# Patient Record
Sex: MALE | Race: BLACK OR AFRICAN AMERICAN | Employment: OTHER | URBAN - METROPOLITAN AREA
[De-identification: names, ages, dates, MRNs, and addresses within clinical notes are randomized per-mention and may not be internally consistent; named-entity substitution may affect disease eponyms.]

---

## 2017-06-19 ENCOUNTER — HOSPITAL ENCOUNTER (OUTPATIENT)
Dept: GENERAL RADIOLOGY | Age: 77
Discharge: HOME | End: 2017-06-19
Payer: MEDICARE

## 2017-06-19 DIAGNOSIS — J44.9 CHRONIC OBSTRUCTIVE PULMONARY DISEASE, UNSPECIFIED COPD TYPE (HCC): ICD-10-CM

## 2017-06-19 PROCEDURE — 71020 XR CHEST PA LAT: CPT

## 2017-08-30 ENCOUNTER — APPOINTMENT (RX ONLY)
Dept: URBAN - METROPOLITAN AREA CLINIC 349 | Facility: CLINIC | Age: 77
Setting detail: DERMATOLOGY
End: 2017-08-30

## 2017-08-30 DIAGNOSIS — L93.0 DISCOID LUPUS ERYTHEMATOSUS: ICD-10-CM | Status: INADEQUATELY CONTROLLED

## 2017-08-30 PROCEDURE — ? COUNSELING

## 2017-08-30 PROCEDURE — 99202 OFFICE O/P NEW SF 15 MIN: CPT

## 2017-08-30 PROCEDURE — ? PRESCRIPTION

## 2017-08-30 RX ORDER — FLUTICASONE PROPIONATE 0.05 MG/G
OINTMENT TOPICAL
Qty: 1 | Refills: 1 | Status: ERX | COMMUNITY
Start: 2017-08-30

## 2017-08-30 RX ADMIN — FLUTICASONE PROPIONATE: 0.05 OINTMENT TOPICAL at 00:00

## 2017-08-30 ASSESSMENT — LOCATION DETAILED DESCRIPTION DERM
LOCATION DETAILED: RIGHT INFERIOR CENTRAL MALAR CHEEK
LOCATION DETAILED: LEFT INFERIOR CENTRAL MALAR CHEEK

## 2017-08-30 ASSESSMENT — LOCATION ZONE DERM: LOCATION ZONE: FACE

## 2017-08-30 ASSESSMENT — LOCATION SIMPLE DESCRIPTION DERM
LOCATION SIMPLE: LEFT CHEEK
LOCATION SIMPLE: RIGHT CHEEK

## 2017-10-02 ENCOUNTER — RX ONLY (OUTPATIENT)
Age: 77
Setting detail: RX ONLY
End: 2017-10-02

## 2017-10-02 ENCOUNTER — APPOINTMENT (RX ONLY)
Dept: URBAN - METROPOLITAN AREA CLINIC 349 | Facility: CLINIC | Age: 77
Setting detail: DERMATOLOGY
End: 2017-10-02

## 2017-10-02 DIAGNOSIS — L93.0 DISCOID LUPUS ERYTHEMATOSUS: ICD-10-CM | Status: IMPROVED

## 2017-10-02 PROCEDURE — ? COUNSELING

## 2017-10-02 PROCEDURE — ? PRESCRIPTION

## 2017-10-02 PROCEDURE — 99212 OFFICE O/P EST SF 10 MIN: CPT

## 2017-10-02 PROCEDURE — ? TREATMENT REGIMEN

## 2017-10-02 RX ORDER — FLUTICASONE PROPIONATE 0.05 MG/G
OINTMENT TOPICAL
Qty: 1 | Refills: 1 | Status: ERX

## 2017-10-02 RX ORDER — MUPIROCIN 20 MG/G
OINTMENT TOPICAL
Qty: 1 | Refills: 1 | Status: ERX | COMMUNITY
Start: 2017-10-02

## 2017-10-02 ASSESSMENT — LOCATION DETAILED DESCRIPTION DERM
LOCATION DETAILED: LEFT INFERIOR CENTRAL MALAR CHEEK
LOCATION DETAILED: RIGHT INFERIOR CENTRAL MALAR CHEEK

## 2017-10-02 ASSESSMENT — LOCATION SIMPLE DESCRIPTION DERM
LOCATION SIMPLE: LEFT CHEEK
LOCATION SIMPLE: RIGHT CHEEK

## 2017-10-02 ASSESSMENT — LOCATION ZONE DERM: LOCATION ZONE: FACE

## 2017-11-10 ENCOUNTER — APPOINTMENT (RX ONLY)
Dept: URBAN - METROPOLITAN AREA CLINIC 349 | Facility: CLINIC | Age: 77
Setting detail: DERMATOLOGY
End: 2017-11-10

## 2017-11-10 DIAGNOSIS — L93.0 DISCOID LUPUS ERYTHEMATOSUS: ICD-10-CM

## 2017-11-10 PROCEDURE — 11900 INJECT SKIN LESIONS </W 7: CPT

## 2017-11-10 PROCEDURE — ? OTHER

## 2017-11-10 PROCEDURE — ? INTRALESIONAL KENALOG

## 2017-11-10 PROCEDURE — ? COUNSELING

## 2017-11-10 PROCEDURE — 99212 OFFICE O/P EST SF 10 MIN: CPT | Mod: 25

## 2017-11-10 ASSESSMENT — LOCATION SIMPLE DESCRIPTION DERM
LOCATION SIMPLE: RIGHT CHEEK
LOCATION SIMPLE: LEFT CHEEK
LOCATION SIMPLE: LEFT FOREHEAD
LOCATION SIMPLE: RIGHT FOREHEAD
LOCATION SIMPLE: CHIN

## 2017-11-10 ASSESSMENT — LOCATION DETAILED DESCRIPTION DERM
LOCATION DETAILED: RIGHT INFERIOR CENTRAL MALAR CHEEK
LOCATION DETAILED: LEFT CENTRAL MALAR CHEEK
LOCATION DETAILED: LEFT CHIN
LOCATION DETAILED: RIGHT CHIN
LOCATION DETAILED: LEFT MEDIAL FOREHEAD
LOCATION DETAILED: RIGHT MEDIAL FOREHEAD
LOCATION DETAILED: RIGHT CENTRAL MALAR CHEEK

## 2017-11-10 ASSESSMENT — LOCATION ZONE DERM: LOCATION ZONE: FACE

## 2017-11-10 NOTE — PROCEDURE: OTHER
Other (Free Text): Patient does not need any refills at this time
Note Text (......Xxx Chief Complaint.): This diagnosis correlates with the
Detail Level: Zone

## 2017-11-14 PROBLEM — J44.9 CHRONIC OBSTRUCTIVE PULMONARY DISEASE (HCC): Status: ACTIVE | Noted: 2017-11-14

## 2018-01-16 ENCOUNTER — APPOINTMENT (RX ONLY)
Dept: URBAN - METROPOLITAN AREA CLINIC 349 | Facility: CLINIC | Age: 78
Setting detail: DERMATOLOGY
End: 2018-01-16

## 2018-01-16 DIAGNOSIS — L93.0 DISCOID LUPUS ERYTHEMATOSUS: ICD-10-CM | Status: STABLE

## 2018-01-16 PROBLEM — J44.9 CHRONIC OBSTRUCTIVE PULMONARY DISEASE, UNSPECIFIED: Status: ACTIVE | Noted: 2018-01-16

## 2018-01-16 PROBLEM — I10 ESSENTIAL (PRIMARY) HYPERTENSION: Status: ACTIVE | Noted: 2018-01-16

## 2018-01-16 PROCEDURE — 99213 OFFICE O/P EST LOW 20 MIN: CPT

## 2018-01-16 PROCEDURE — ? COUNSELING

## 2018-01-16 PROCEDURE — ? TREATMENT REGIMEN

## 2018-01-16 ASSESSMENT — LOCATION DETAILED DESCRIPTION DERM
LOCATION DETAILED: LEFT INFERIOR CENTRAL MALAR CHEEK
LOCATION DETAILED: RIGHT MEDIAL FOREHEAD
LOCATION DETAILED: LEFT CENTRAL MALAR CHEEK
LOCATION DETAILED: LEFT CHIN
LOCATION DETAILED: RIGHT INFERIOR CENTRAL MALAR CHEEK
LOCATION DETAILED: RIGHT INFERIOR CENTRAL MALAR CHEEK

## 2018-01-16 ASSESSMENT — LOCATION SIMPLE DESCRIPTION DERM
LOCATION SIMPLE: RIGHT CHEEK
LOCATION SIMPLE: LEFT CHEEK
LOCATION SIMPLE: RIGHT FOREHEAD
LOCATION SIMPLE: RIGHT CHEEK
LOCATION SIMPLE: CHIN
LOCATION SIMPLE: LEFT CHEEK

## 2018-01-16 ASSESSMENT — LOCATION ZONE DERM
LOCATION ZONE: FACE
LOCATION ZONE: FACE

## 2018-04-03 ENCOUNTER — RX ONLY (OUTPATIENT)
Age: 78
Setting detail: RX ONLY
End: 2018-04-03

## 2018-04-03 RX ORDER — MUPIROCIN 20 MG/G
OINTMENT TOPICAL
Qty: 1 | Refills: 1 | Status: ERX

## 2018-04-17 ENCOUNTER — APPOINTMENT (RX ONLY)
Dept: URBAN - METROPOLITAN AREA CLINIC 349 | Facility: CLINIC | Age: 78
Setting detail: DERMATOLOGY
End: 2018-04-17

## 2018-04-17 DIAGNOSIS — L93.0 DISCOID LUPUS ERYTHEMATOSUS: ICD-10-CM | Status: IMPROVED

## 2018-04-17 PROCEDURE — ? PRESCRIPTION

## 2018-04-17 PROCEDURE — ? TREATMENT REGIMEN

## 2018-04-17 PROCEDURE — 99213 OFFICE O/P EST LOW 20 MIN: CPT

## 2018-04-17 PROCEDURE — ? COUNSELING

## 2018-04-17 RX ORDER — MUPIROCIN 20 MG/G
OINTMENT TOPICAL
Qty: 1 | Refills: 4 | Status: ERX

## 2018-04-17 RX ORDER — FLUTICASONE PROPIONATE 0.05 MG/G
OINTMENT TOPICAL
Qty: 1 | Refills: 3 | Status: ERX

## 2018-04-17 ASSESSMENT — LOCATION DETAILED DESCRIPTION DERM
LOCATION DETAILED: RIGHT INFERIOR CENTRAL MALAR CHEEK
LOCATION DETAILED: LEFT INFERIOR CENTRAL MALAR CHEEK
LOCATION DETAILED: LEFT INFERIOR CENTRAL MALAR CHEEK

## 2018-04-17 ASSESSMENT — LOCATION SIMPLE DESCRIPTION DERM
LOCATION SIMPLE: LEFT CHEEK
LOCATION SIMPLE: LEFT CHEEK
LOCATION SIMPLE: RIGHT CHEEK

## 2018-04-17 ASSESSMENT — PAIN INTENSITY VAS: HOW INTENSE IS YOUR PAIN 0 BEING NO PAIN, 10 BEING THE MOST SEVERE PAIN POSSIBLE?: NO PAIN

## 2018-04-17 ASSESSMENT — LOCATION ZONE DERM
LOCATION ZONE: FACE
LOCATION ZONE: FACE

## 2018-04-17 NOTE — PROCEDURE: TREATMENT REGIMEN
Continue Regimen: Fluticasone and mupuricin
Initiate Treatment: Sun screen everyday
Detail Level: Generalized

## 2018-06-01 ENCOUNTER — HOSPITAL ENCOUNTER (OUTPATIENT)
Dept: GENERAL RADIOLOGY | Age: 78
Discharge: HOME | End: 2018-06-01
Payer: MEDICARE

## 2018-06-01 DIAGNOSIS — J44.9 CHRONIC OBSTRUCTIVE PULMONARY DISEASE, UNSPECIFIED COPD TYPE (HCC): ICD-10-CM

## 2018-06-01 PROCEDURE — 71046 X-RAY EXAM CHEST 2 VIEWS: CPT

## 2018-10-16 ENCOUNTER — APPOINTMENT (RX ONLY)
Dept: URBAN - METROPOLITAN AREA CLINIC 349 | Facility: CLINIC | Age: 78
Setting detail: DERMATOLOGY
End: 2018-10-16

## 2018-10-16 DIAGNOSIS — L93.0 DISCOID LUPUS ERYTHEMATOSUS: ICD-10-CM | Status: IMPROVED

## 2018-10-16 PROCEDURE — ? COUNSELING

## 2018-10-16 PROCEDURE — ? TREATMENT REGIMEN

## 2018-10-16 PROCEDURE — ? PRESCRIPTION

## 2018-10-16 PROCEDURE — 99213 OFFICE O/P EST LOW 20 MIN: CPT

## 2018-10-16 RX ORDER — MUPIROCIN 20 MG/G
OINTMENT TOPICAL
Qty: 1 | Refills: 5 | Status: ERX

## 2018-10-16 RX ORDER — FLUTICASONE PROPIONATE 0.05 MG/G
OINTMENT TOPICAL
Qty: 1 | Refills: 5 | Status: ERX

## 2018-10-16 ASSESSMENT — LOCATION ZONE DERM: LOCATION ZONE: FACE

## 2018-10-16 ASSESSMENT — LOCATION DETAILED DESCRIPTION DERM: LOCATION DETAILED: RIGHT SUPERIOR MEDIAL FOREHEAD

## 2018-10-16 ASSESSMENT — LOCATION SIMPLE DESCRIPTION DERM: LOCATION SIMPLE: RIGHT FOREHEAD

## 2018-10-16 NOTE — PROCEDURE: TREATMENT REGIMEN
Continue Regimen: Fluticasone twice daily to affected area \\nMupuricin daily  to affected  area
Detail Level: Zone

## 2019-04-23 ENCOUNTER — APPOINTMENT (RX ONLY)
Dept: URBAN - METROPOLITAN AREA CLINIC 349 | Facility: CLINIC | Age: 79
Setting detail: DERMATOLOGY
End: 2019-04-23

## 2019-04-23 DIAGNOSIS — L93.0 DISCOID LUPUS ERYTHEMATOSUS: ICD-10-CM | Status: STABLE

## 2019-04-23 PROCEDURE — 99213 OFFICE O/P EST LOW 20 MIN: CPT

## 2019-04-23 PROCEDURE — ? PRESCRIPTION

## 2019-04-23 PROCEDURE — ? ORDER TESTS

## 2019-04-23 PROCEDURE — ? RECOMMENDATIONS

## 2019-04-23 PROCEDURE — ? COUNSELING

## 2019-04-23 PROCEDURE — ? TREATMENT REGIMEN

## 2019-04-23 RX ORDER — MUPIROCIN 20 MG/G
OINTMENT TOPICAL
Qty: 1 | Refills: 5 | Status: ERX

## 2019-04-23 RX ORDER — FLUTICASONE PROPIONATE 0.05 MG/G
OINTMENT TOPICAL
Qty: 1 | Refills: 5 | Status: ERX

## 2019-04-23 ASSESSMENT — LOCATION DETAILED DESCRIPTION DERM: LOCATION DETAILED: RIGHT SUPERIOR MEDIAL FOREHEAD

## 2019-04-23 ASSESSMENT — LOCATION ZONE DERM: LOCATION ZONE: FACE

## 2019-04-23 ASSESSMENT — LOCATION SIMPLE DESCRIPTION DERM: LOCATION SIMPLE: RIGHT FOREHEAD

## 2019-04-23 NOTE — PROCEDURE: RECOMMENDATIONS
Detail Level: Detailed
Recommendations (Free Text): Will check blood work and if abnormal will refer to rheumatologist

## 2019-05-31 ENCOUNTER — HOSPITAL ENCOUNTER (OUTPATIENT)
Dept: GENERAL RADIOLOGY | Age: 79
Discharge: HOME | End: 2019-05-31
Attending: NURSE PRACTITIONER
Payer: MEDICARE

## 2019-05-31 DIAGNOSIS — J44.9 CHRONIC OBSTRUCTIVE PULMONARY DISEASE, UNSPECIFIED COPD TYPE (HCC): ICD-10-CM

## 2019-05-31 PROCEDURE — 71046 X-RAY EXAM CHEST 2 VIEWS: CPT

## 2020-05-27 ENCOUNTER — HOSPITAL ENCOUNTER (OUTPATIENT)
Dept: GENERAL RADIOLOGY | Age: 80
Discharge: HOME | End: 2020-05-27
Payer: MEDICARE

## 2020-05-27 DIAGNOSIS — J44.9 CHRONIC OBSTRUCTIVE PULMONARY DISEASE, UNSPECIFIED COPD TYPE (HCC): ICD-10-CM

## 2020-05-27 DIAGNOSIS — Z77.090 ASBESTOS EXPOSURE: ICD-10-CM

## 2020-05-27 PROCEDURE — 71046 X-RAY EXAM CHEST 2 VIEWS: CPT

## 2021-06-24 PROBLEM — I50.42 CHRONIC COMBINED SYSTOLIC AND DIASTOLIC CONGESTIVE HEART FAILURE (HCC): Status: ACTIVE | Noted: 2021-06-24

## 2021-06-24 PROBLEM — I42.9 CARDIOMYOPATHY (HCC): Status: ACTIVE | Noted: 2021-06-24

## 2022-03-19 PROBLEM — I42.9 CARDIOMYOPATHY (HCC): Status: ACTIVE | Noted: 2021-06-24

## 2022-03-19 PROBLEM — J44.9 CHRONIC OBSTRUCTIVE PULMONARY DISEASE (HCC): Status: ACTIVE | Noted: 2017-11-14

## 2022-03-19 PROBLEM — I50.42 CHRONIC COMBINED SYSTOLIC AND DIASTOLIC CONGESTIVE HEART FAILURE (HCC): Status: ACTIVE | Noted: 2021-06-24

## 2022-06-17 NOTE — PROGRESS NOTES
He is an 76-year-old male seen today for follow-up of COPD, history of asbestos exposure, , hx dilated cardiomyopathy/CHF and had echo with EF 25%, moderate to severe TR and severe pulmonary hypertension with RVSP greater than 60 mmHg, elevated BNP.  Also had multifocal atrial tachycardia. He is followed at [facility] congestive heart failure clinic. Has had adjustment in RX.          DIAGNOSTICS:         Annual chest x-ray and spirometry for a number of years with Dr. Gabriella Bishop. Chest x-ray 6/19/2017. Spirometry 9/799161DHQGNT obstructive defect with decreased FVC, no significant interval change. Spirometry 6/1/2018severe obstructive defect with decreased FVC.  Interval improvement in FEV1.  No significant change in FVC. CXR 6/1/2018no acute cardiopulmonary abnormality. CXR 5/31/2019 changes consistent with COPD.  Lungs are clear.  No acute process. Spirometry 5/31/2019 moderately severe obstructive defect with decreased FVC, no significant change. CXR 5/27/2020-no acute pulmonary process. Spirometry 11/18/2020very severe obstructive defect, interval decline. ECHO 5/3/2021EF 25-30%, severe global kinesis of left ventricle, grade 1 diastolic dysfunction.  Moderate to severe TR, RVSP estimated greater than 60 mmHg.  Moderate  MR. Echo obtained during hospitalization with Acute congestive heart failure.  BNP3,385. CXR 5/2/2021[facility]progress cardiomegaly with hazy bibasilar lung opacities which could relate to early CHF or atypical pneumonia. CPFT's 5/6/2021severe airway obstruction, diffusion capacity is mildly low but high for the measured alveolar volume suggesting chest wall limitation or reduced pulse generation  as possible explanations for the restriction.  Lack of response to bronchodilators may reflect a refractory state, prolonged use may be of benefit.  Lung volumes are reduced indicating a concurrent restrictive process.    Spirometry 6/24/2021severe obstructive defect with decreased FVC, interval improvement in FVC and FEV1.

## 2022-06-20 ENCOUNTER — OFFICE VISIT (OUTPATIENT)
Dept: PULMONOLOGY | Age: 82
End: 2022-06-20
Payer: MEDICARE

## 2022-06-20 VITALS
SYSTOLIC BLOOD PRESSURE: 118 MMHG | HEART RATE: 78 BPM | HEIGHT: 71 IN | DIASTOLIC BLOOD PRESSURE: 78 MMHG | OXYGEN SATURATION: 98 % | BODY MASS INDEX: 21.28 KG/M2 | WEIGHT: 152 LBS | TEMPERATURE: 98.2 F

## 2022-06-20 DIAGNOSIS — Z77.090 ASBESTOS EXPOSURE: ICD-10-CM

## 2022-06-20 DIAGNOSIS — I42.9 CARDIOMYOPATHY, UNSPECIFIED TYPE (HCC): ICD-10-CM

## 2022-06-20 DIAGNOSIS — J44.9 CHRONIC OBSTRUCTIVE PULMONARY DISEASE, UNSPECIFIED COPD TYPE (HCC): Primary | ICD-10-CM

## 2022-06-20 DIAGNOSIS — I50.42 CHRONIC COMBINED SYSTOLIC AND DIASTOLIC CONGESTIVE HEART FAILURE (HCC): ICD-10-CM

## 2022-06-20 LAB
EXPIRATORY TIME: NORMAL
FEF 25-75% %PRED-PRE: NORMAL
FEF 25-75% PRED: NORMAL
FEF 25-75%-PRE: NORMAL
FEV1 %PRED-PRE: 46 %
FEV1 PRED: NORMAL
FEV1/FVC %PRED-PRE: NORMAL
FEV1/FVC PRED: NORMAL
FEV1/FVC: 53 %
FEV1: 1.26 L
FVC %PRED-PRE: 64 %
FVC PRED: NORMAL
FVC: 2.38 L
PEF %PRED-PRE: NORMAL
PEF PRED: NORMAL
PEF-PRE: NORMAL

## 2022-06-20 PROCEDURE — 99213 OFFICE O/P EST LOW 20 MIN: CPT | Performed by: NURSE PRACTITIONER

## 2022-06-20 PROCEDURE — 94010 BREATHING CAPACITY TEST: CPT | Performed by: NURSE PRACTITIONER

## 2022-06-20 PROCEDURE — 1123F ACP DISCUSS/DSCN MKR DOCD: CPT | Performed by: NURSE PRACTITIONER

## 2022-06-20 RX ORDER — UMECLIDINIUM BROMIDE AND VILANTEROL TRIFENATATE 62.5; 25 UG/1; UG/1
POWDER RESPIRATORY (INHALATION)
Qty: 3 EACH | Refills: 3 | Status: SHIPPED | OUTPATIENT
Start: 2022-06-20

## 2022-06-20 RX ORDER — ALBUTEROL SULFATE 90 UG/1
AEROSOL, METERED RESPIRATORY (INHALATION)
Qty: 3 EACH | Refills: 3 | Status: SHIPPED | OUTPATIENT
Start: 2022-06-20

## 2022-06-20 RX ORDER — THEOPHYLLINE 400 MG/1
TABLET, EXTENDED RELEASE ORAL
Qty: 90 TABLET | Refills: 3 | Status: SHIPPED | OUTPATIENT
Start: 2022-06-20

## 2022-06-20 RX ORDER — KRILL/OM-3/DHA/EPA/PHOSPHO/AST 500MG-86MG
CAPSULE ORAL
COMMUNITY

## 2022-06-20 ASSESSMENT — ENCOUNTER SYMPTOMS
NAUSEA: 0
DIARRHEA: 0
COUGH: 0
CONSTIPATION: 0
SHORTNESS OF BREATH: 0
WHEEZING: 0
ABDOMINAL PAIN: 0
VOMITING: 0
CHEST TIGHTNESS: 0

## 2022-06-20 ASSESSMENT — PULMONARY FUNCTION TESTS
FEV1/FVC: 53
FVC: 2.38
FEV1: 1.26
FEV1_PERCENT_PREDICTED_PRE: 46
FVC_PERCENT_PREDICTED_PRE: 64
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
## 2022-06-20 NOTE — PATIENT INSTRUCTIONS
Continue anoro 1 inhalation once daily. Albuterol 2 puffs 4 times daily if needed for shortness of breath or wheezing. Continue theophylline as prescribed. Follow up in 12 months with spirometry, sooner if needed. He is up to date on covid, pneumonia and flu vaccines. Seasonal flu vaccine in the fall.

## 2023-03-22 RX ORDER — ALBUTEROL SULFATE 90 UG/1
AEROSOL, METERED RESPIRATORY (INHALATION)
Qty: 6.7 EACH | Refills: 11 | Status: SHIPPED | OUTPATIENT
Start: 2023-03-22

## 2023-06-19 NOTE — PROGRESS NOTES
He is an 42-year-old male seen today for follow-up of COPD, history of asbestos exposure,  hx dilated cardiomyopathy/CHF (EF 25%, moderate to severe TR and severe pulmonary hypertension with RVSP greater than 60 mmHg, elevated BNP. He is followed at [facility] congestive heart failure clinic. DIAGNOSTICS:         Annual chest x-ray and spirometry for a number of years with Dr. Gustavo Bryant. Chest x-ray 6/19/2017. Spirometry 6/-severe obstructive defect with decreased FVC, no significant interval change. Spirometry 6/1/2018-severe obstructive defect with decreased FVC. Interval improvement in FEV1. No significant change in FVC. CXR 6/1/2018-no acute cardiopulmonary abnormality. CXR 5/31/2019- changes consistent with COPD. Lungs are clear. No acute process. Spirometry 5/31/2019- moderately severe obstructive defect with decreased FVC, no significant change. CXR 5/27/2020-no acute pulmonary process. Spirometry 11/18/2020-very severe obstructive defect, interval decline. ECHO 5/3/2021-EF 25-30%, severe global kinesis of left ventricle, grade 1 diastolic dysfunction. Moderate to severe TR, RVSP estimated greater than 60 mmHg. Moderate  MR. Echo obtained during hospitalization with Acute congestive heart failure. [id number]. CXR 5/2/2021-[facility]-progress cardiomegaly with hazy bibasilar lung opacities which could relate to early CHF or atypical pneumonia. CPFT's 5/6/2021-severe airway obstruction, diffusion capacity is mildly low but high for the measured alveolar volume suggesting chest wall limitation or reduced pulse generation  as possible explanations for the restriction. Lack of response to bronchodilators may reflect a refractory state, prolonged use may be of benefit. Lung volumes are reduced indicating a concurrent restrictive process. Spirometry 6/24/2021-severe obstructive defect with decreased FVC, interval improvement in FVC and FEV1.

## 2023-06-20 ENCOUNTER — OFFICE VISIT (OUTPATIENT)
Dept: PULMONOLOGY | Age: 83
End: 2023-06-20
Payer: MEDICARE

## 2023-06-20 VITALS
OXYGEN SATURATION: 99 % | SYSTOLIC BLOOD PRESSURE: 122 MMHG | HEIGHT: 71 IN | HEART RATE: 74 BPM | DIASTOLIC BLOOD PRESSURE: 78 MMHG | BODY MASS INDEX: 22.12 KG/M2 | WEIGHT: 158 LBS | TEMPERATURE: 97.9 F

## 2023-06-20 DIAGNOSIS — I50.42 CHRONIC COMBINED SYSTOLIC AND DIASTOLIC CONGESTIVE HEART FAILURE (HCC): ICD-10-CM

## 2023-06-20 DIAGNOSIS — Z77.090 ASBESTOS EXPOSURE: ICD-10-CM

## 2023-06-20 DIAGNOSIS — J44.9 COPD, SEVERE (HCC): Primary | ICD-10-CM

## 2023-06-20 LAB
EXPIRATORY TIME: NORMAL
FEF 25-75% %PRED-PRE: NORMAL
FEF 25-75% PRED: NORMAL
FEF 25-75%-PRE: NORMAL
FEV1 %PRED-PRE: 50 %
FEV1 PRED: NORMAL
FEV1/FVC %PRED-PRE: NORMAL
FEV1/FVC PRED: NORMAL
FEV1/FVC: 67 %
FEV1: 1.35 L
FVC %PRED-PRE: 54 %
FVC PRED: NORMAL
FVC: 2.01 L
PEF %PRED-PRE: NORMAL
PEF PRED: NORMAL
PEF-PRE: NORMAL

## 2023-06-20 PROCEDURE — 3074F SYST BP LT 130 MM HG: CPT | Performed by: NURSE PRACTITIONER

## 2023-06-20 PROCEDURE — 99214 OFFICE O/P EST MOD 30 MIN: CPT | Performed by: NURSE PRACTITIONER

## 2023-06-20 PROCEDURE — 94010 BREATHING CAPACITY TEST: CPT | Performed by: NURSE PRACTITIONER

## 2023-06-20 PROCEDURE — 3078F DIAST BP <80 MM HG: CPT | Performed by: NURSE PRACTITIONER

## 2023-06-20 PROCEDURE — 1123F ACP DISCUSS/DSCN MKR DOCD: CPT | Performed by: NURSE PRACTITIONER

## 2023-06-20 RX ORDER — UMECLIDINIUM BROMIDE AND VILANTEROL TRIFENATATE 62.5; 25 UG/1; UG/1
POWDER RESPIRATORY (INHALATION)
Qty: 1 EACH | Refills: 11 | Status: SHIPPED | OUTPATIENT
Start: 2023-06-20

## 2023-06-20 RX ORDER — ALBUTEROL SULFATE 90 UG/1
AEROSOL, METERED RESPIRATORY (INHALATION)
Qty: 6.7 EACH | Refills: 11 | Status: SHIPPED | OUTPATIENT
Start: 2023-06-20

## 2023-06-20 RX ORDER — THEOPHYLLINE 400 MG/1
TABLET, EXTENDED RELEASE ORAL
Qty: 90 TABLET | Refills: 3 | Status: SHIPPED | OUTPATIENT
Start: 2023-06-20

## 2023-06-20 RX ORDER — ASCORBIC ACID 125 MG
TABLET,CHEWABLE ORAL
COMMUNITY

## 2023-06-20 ASSESSMENT — ENCOUNTER SYMPTOMS
WHEEZING: 0
SPUTUM PRODUCTION: 0
COUGH: 0
SHORTNESS OF BREATH: 1

## 2023-06-20 ASSESSMENT — PULMONARY FUNCTION TESTS
FVC_PERCENT_PREDICTED_PRE: 54
FVC: 2.01
FEV1: 1.35
FEV1/FVC: 67
FEV1_PERCENT_PREDICTED_PRE: 50

## 2023-06-20 NOTE — PROGRESS NOTES
[address] [facility]. [address], [address] [facility]  [phone]    Patient Name:  Beata Dawkins. YOB: 1940    Office Visit 2023      CHIEF COMPLAINT:      Chief Complaint   Patient presents with    Follow-up    COPD    Other     Asbestos exposure         ASSESSMENT:   (Medical Decision Making)                                                                                                                                          Encounter Diagnoses   Name Primary? COPD, severe ([address] Yes    Asbestos exposure     Chronic combined systolic and diastolic congestive heart failure ([address]     Contact with and (suspected) exposure to asbestos      He is stable symptomatically and compliant with maintenance inhaler. Typically uses albuterol inhaler every morning. He has maintained that theophylline has been beneficial for him and wishes to continue it. Today spirometry is technically limited. Last CXR revealed no worrisome changes for asbestos exposure. CHF appears compensated at this time, he is followed at [facility] CHF clinic. PLAN:     Continue anoro 1 inhalation once daily. Albuterol 2 puffs 4 times daily if needed for shortness of breath or wheezing. Continue theophylline as prescribed. Follow up in 12 months with spirometry, sooner if needed. He is up to date on covid, pneumonia and flu vaccines. Seasonal flu vaccine in the fall.     Orders Placed This Encounter   Procedures    Spirometry Without Bronchodilator       Orders Placed This Encounter   Medications    umeclidinium-vilanterol (ANORO ELLIPTA) 62.5-25 MCG/ACT inhaler     Si inhalation every morning     Dispense:  1 each     Refill:  11    albuterol sulfate HFA (PROVENTIL;VENTOLIN;PROAIR) 108 (90 Base) MCG/ACT inhaler     Sig: INHALE 2 PUFFS BY MOUTH 4 TIMES A DAY AS NEEDED FOR WHEEZING or shortness of breath     Dispense:  6.7 each     Refill:

## 2024-05-29 RX ORDER — THEOPHYLLINE 400 MG/1
TABLET, EXTENDED RELEASE ORAL
Qty: 90 TABLET | Refills: 0 | Status: SHIPPED | OUTPATIENT
Start: 2024-05-29

## 2024-06-20 ENCOUNTER — OFFICE VISIT (OUTPATIENT)
Dept: PULMONOLOGY | Age: 84
End: 2024-06-20
Payer: MEDICARE

## 2024-06-20 VITALS
DIASTOLIC BLOOD PRESSURE: 65 MMHG | HEART RATE: 72 BPM | HEIGHT: 71 IN | WEIGHT: 152 LBS | RESPIRATION RATE: 16 BRPM | OXYGEN SATURATION: 99 % | BODY MASS INDEX: 21.28 KG/M2 | SYSTOLIC BLOOD PRESSURE: 115 MMHG | TEMPERATURE: 97.8 F

## 2024-06-20 DIAGNOSIS — I50.42 CHRONIC COMBINED SYSTOLIC AND DIASTOLIC CONGESTIVE HEART FAILURE (HCC): ICD-10-CM

## 2024-06-20 DIAGNOSIS — Z77.090 ASBESTOS EXPOSURE: ICD-10-CM

## 2024-06-20 DIAGNOSIS — J44.9 COPD, SEVERE (HCC): Primary | ICD-10-CM

## 2024-06-20 LAB
EXPIRATORY TIME: NORMAL
FEF 25-75% %PRED-PRE: NORMAL
FEF 25-75% PRED: NORMAL
FEF 25-75-PRE: NORMAL
FEV1 %PRED-PRE: 55 %
FEV1 PRED: 2.39 L
FEV1/FVC %PRED-PRE: NORMAL
FEV1/FVC PRED: NORMAL
FEV1/FVC: 64 %
FEV1: 1.31 L
FVC %PRED-PRE: 63 %
FVC PRED: 3.25 L
FVC: 2.06 L
PEF %PRED-PRE: NORMAL
PEF PRED: NORMAL
PEF-PRE: NORMAL

## 2024-06-20 PROCEDURE — 3078F DIAST BP <80 MM HG: CPT | Performed by: NURSE PRACTITIONER

## 2024-06-20 PROCEDURE — 1123F ACP DISCUSS/DSCN MKR DOCD: CPT | Performed by: NURSE PRACTITIONER

## 2024-06-20 PROCEDURE — 3074F SYST BP LT 130 MM HG: CPT | Performed by: NURSE PRACTITIONER

## 2024-06-20 PROCEDURE — 99214 OFFICE O/P EST MOD 30 MIN: CPT | Performed by: NURSE PRACTITIONER

## 2024-06-20 RX ORDER — ALBUTEROL SULFATE 90 UG/1
AEROSOL, METERED RESPIRATORY (INHALATION)
Qty: 8 G | Refills: 11 | Status: SHIPPED | OUTPATIENT
Start: 2024-06-20

## 2024-06-20 RX ORDER — THEOPHYLLINE 400 MG/1
400 TABLET, EXTENDED RELEASE ORAL DAILY
Qty: 90 TABLET | Refills: 3 | Status: SHIPPED | OUTPATIENT
Start: 2024-06-20

## 2024-06-20 RX ORDER — UMECLIDINIUM BROMIDE AND VILANTEROL TRIFENATATE 62.5; 25 UG/1; UG/1
POWDER RESPIRATORY (INHALATION)
Qty: 1 EACH | Refills: 11 | Status: SHIPPED | OUTPATIENT
Start: 2024-06-20

## 2024-06-20 ASSESSMENT — PULMONARY FUNCTION TESTS
FEV1/FVC: 64
FVC_PERCENT_PREDICTED_PRE: 63
FEV1: 1.31
FVC_PREDICTED: 3.25
FEV1_PERCENT_PREDICTED_PRE: 55
FVC: 2.06
FEV1_PREDICTED: 2.39

## 2024-06-20 ASSESSMENT — ENCOUNTER SYMPTOMS
BACK PAIN: 1
COUGH: 0
WHEEZING: 0
SHORTNESS OF BREATH: 0
HEMOPTYSIS: 0
SPUTUM PRODUCTION: 0

## 2024-06-20 NOTE — PATIENT INSTRUCTIONS
Continue anoro 1 inhalation once daily.    Albuterol 2 puffs 4 times daily if needed for shortness of breath or wheezing.    Continue theophylline as prescribed.    Recommend newest COVID booster, RSV vaccine.  Prevnar 20 pneumonia vaccine.  Flu vaccine in the fall.    Follow up in 12 months with spirometry, sooner if needed.

## 2024-06-20 NOTE — PROGRESS NOTES
[facility]  [address]  [place] [address]  [phone]    Patient Name:  Jerrell Mendez Jr.    YOB: 1940    Office Visit 2024      CHIEF COMPLAINT:      Chief Complaint   Patient presents with    Follow-up         ASSESSMENT:   (Medical Decision Making)                                                                                                                                          Encounter Diagnoses   Name Primary?    COPD, severe (HCC) Yes    Asbestos exposure     Chronic combined systolic and diastolic congestive heart failure (HCC)      He is stable symptomatically.  There has been interval improvement in FEV1.  He is compliant with maintenance inhaler.  Has been on theophylline for some time and wishes to continue it as he believes it has been beneficial for him.  He has required albuterol intermittently with good response.    Last CXR revealed no worrisome findings suggestive of asbestosis/asbestos exposure.    CHF appears compensated at this time.                      PLAN:     Continue anoro 1 inhalation once daily.    Albuterol 2 puffs 4 times daily if needed for shortness of breath or wheezing.    Continue theophylline as prescribed.    Recommend newest COVID booster, RSV vaccine.  Prevnar 20 pneumonia vaccine.  Flu vaccine in the fall.    Follow up in 12 months with spirometry, sooner if needed.    Orders Placed This Encounter   Procedures    Spirometry Without Bronchodilator       Orders Placed This Encounter   Medications    albuterol sulfate HFA (PROVENTIL;VENTOLIN;PROAIR) 108 (90 Base) MCG/ACT inhaler     Sig: INHALE 2 PUFFS BY MOUTH 4 TIMES A DAY AS NEEDED FOR WHEEZING or shortness of breath     Dispense:  8 g     Refill:  11    umeclidinium-vilanterol (ANORO ELLIPTA) 62.5-25 MCG/ACT inhaler     Si inhalation every morning     Dispense:  1 each     Refill:  11    theophylline (UNIPHYL) 400 MG extended release tablet     
indicating a concurrent restrictive process.  Spirometry 6/24/2021-severe obstructive defect with decreased FVC, interval improvement in FVC and FEV1.     Spirometry 6/20/2022-severe obstructive defect with decreased FVC, no significant change.  Technically limited study with abnormal (flattened) flow-volume loop, unchanged.  Spirometry 6/20/2023-moderately severe restrictive defect, technically limited study.  Prior CPFT's have demonstrated severe obstruction.

## 2024-09-30 DIAGNOSIS — J44.9 COPD, SEVERE (HCC): Primary | ICD-10-CM

## 2024-10-01 RX ORDER — ALBUTEROL SULFATE 90 UG/1
INHALANT RESPIRATORY (INHALATION)
Qty: 8.5 EACH | Refills: 11 | OUTPATIENT
Start: 2024-10-01

## 2024-12-16 RX ORDER — ALBUTEROL SULFATE 90 UG/1
INHALANT RESPIRATORY (INHALATION)
Qty: 8.5 EACH | Refills: 11 | OUTPATIENT
Start: 2024-12-16

## 2025-07-01 NOTE — PROGRESS NOTES
He is a very pleasant 85-year-old male seen today for follow-up of COPD, history of asbestos exposure,  hx dilated cardiomyopathy/CHF (EF 25%, moderate to severe TR and severe pulmonary hypertension with RVSP greater than 60 mmHg, elevated BNP. He is followed at [facility] congestive heart failure clinic.       His wife passed in 2021 and then lost his oldest daughter in 2022.  He resides alone but has another daughter in [place]. He shares with me that she is a DNP, has her own office.  Also, his grandaughter is in college.            DIAGNOSTICS:         Annual chest x-ray and spirometry for a number of years with Dr. Kapadia.   Chest x-ray 6/19/2017.   Spirometry 6/-severe obstructive defect with decreased FVC, no significant interval change.   Spirometry 6/1/2018-severe obstructive defect with decreased FVC.  Interval improvement in FEV1.  No significant change in FVC.   CXR 6/1/2018-no acute cardiopulmonary abnormality.   CXR 5/31/2019- changes consistent with COPD.  Lungs are clear.  No acute process.   Spirometry 5/31/2019- moderately severe obstructive defect with decreased FVC, no significant change.   CXR 5/27/2020-no acute pulmonary process.   Spirometry 11/18/2020-very severe obstructive defect, interval decline.   ECHO 5/3/2021-EF 25-30%, severe global kinesis of left ventricle, grade 1 diastolic dysfunction.  Moderate to severe TR, RVSP estimated greater than 60 mmHg.  Moderate  MR. Echo obtained during hospitalization with Acute congestive heart failure.  BNP-3,385.   CXR 5/2/2021-[place] [facility]-cardiomegaly with hazy bibasilar lung opacities which could relate to early CHF or atypical pneumonia.   CPFT's 5/6/2021-severe airway obstruction, diffusion capacity is mildly low but high for the measured alveolar volume suggesting chest wall limitation or reduced pulse generation  as possible explanations for the restriction.  Lack of response to bronchodilators may reflect

## 2025-07-02 ENCOUNTER — OFFICE VISIT (OUTPATIENT)
Dept: PULMONOLOGY | Age: 85
End: 2025-07-02

## 2025-07-02 VITALS
OXYGEN SATURATION: 93 % | TEMPERATURE: 97.5 F | RESPIRATION RATE: 18 BRPM | DIASTOLIC BLOOD PRESSURE: 83 MMHG | HEIGHT: 71 IN | HEART RATE: 77 BPM | SYSTOLIC BLOOD PRESSURE: 148 MMHG | WEIGHT: 154 LBS | BODY MASS INDEX: 21.56 KG/M2

## 2025-07-02 DIAGNOSIS — Z77.090 ASBESTOS EXPOSURE: ICD-10-CM

## 2025-07-02 DIAGNOSIS — J44.9 COPD, SEVERE (HCC): Primary | ICD-10-CM

## 2025-07-02 DIAGNOSIS — I50.42 CHRONIC COMBINED SYSTOLIC AND DIASTOLIC CONGESTIVE HEART FAILURE (HCC): ICD-10-CM

## 2025-07-02 LAB
EXPIRATORY TIME: NORMAL
FEF 25-75% %PRED-PRE: NORMAL
FEF 25-75% PRED: NORMAL
FEF 25-75-PRE: NORMAL
FEV1 %PRED-PRE: 50 %
FEV1 PRED: 2.37 L
FEV1/FVC %PRED-PRE: NORMAL
FEV1/FVC PRED: NORMAL
FEV1/FVC: 60 %
FEV1: 1.18 L
FVC %PRED-PRE: 61 %
FVC PRED: 3.23 L
FVC: 1.97 L
PEF %PRED-PRE: NORMAL
PEF PRED: NORMAL
PEF-PRE: NORMAL

## 2025-07-02 RX ORDER — UMECLIDINIUM BROMIDE AND VILANTEROL TRIFENATATE 62.5; 25 UG/1; UG/1
POWDER RESPIRATORY (INHALATION)
Qty: 1 EACH | Refills: 11 | Status: SHIPPED | OUTPATIENT
Start: 2025-07-02

## 2025-07-02 RX ORDER — ALBUTEROL SULFATE 90 UG/1
INHALANT RESPIRATORY (INHALATION)
Qty: 8 G | Refills: 11 | Status: SHIPPED | OUTPATIENT
Start: 2025-07-02

## 2025-07-02 RX ORDER — THEOPHYLLINE 400 MG/1
400 TABLET, EXTENDED RELEASE ORAL DAILY
Qty: 90 TABLET | Refills: 3 | OUTPATIENT
Start: 2025-07-02

## 2025-07-02 RX ORDER — THEOPHYLLINE 400 MG/1
400 TABLET, EXTENDED RELEASE ORAL DAILY
Qty: 90 TABLET | Refills: 3 | Status: SHIPPED | OUTPATIENT
Start: 2025-07-02

## 2025-07-02 ASSESSMENT — PULMONARY FUNCTION TESTS
FEV1_PREDICTED: 2.37
FVC_PREDICTED: 3.23
FVC_PERCENT_PREDICTED_PRE: 61
FEV1/FVC: 60
FVC: 1.97
FEV1_PERCENT_PREDICTED_PRE: 50
FEV1: 1.18

## 2025-07-02 ASSESSMENT — ENCOUNTER SYMPTOMS
HEMOPTYSIS: 0
COUGH: 0
SHORTNESS OF BREATH: 0
WHEEZING: 0
SPUTUM PRODUCTION: 0

## 2025-07-02 NOTE — PROGRESS NOTES
[facility]  [address] , [address]  [place] [address]  [phone]    Patient Name:  Jerrell Mendez Jr.    YOB: 1940    Office Visit 7/2/2025      CHIEF COMPLAINT:      Chief Complaint   Patient presents with    Follow-up     COPD, PFT's,         ASSESSMENT:   (Medical Decision Making)                                                                                                                                          Encounter Diagnoses   Name Primary?    COPD, severe (HCC) Yes    Chronic combined systolic and diastolic congestive heart failure (HCC)     Asbestos exposure                            PLAN:     Orders Placed This Encounter   Procedures    Spirometry Without Bronchodilator       No orders of the defined types were placed in this encounter.          Sienna Lopes MA    Total  time spent with patient -  min.     Collaborating MD:        HISTORY OF PRESENT ILLNESS:    He is a very pleasant 85-year-old male seen today for follow-up of COPD, history of asbestos exposure,  hx dilated cardiomyopathy/CHF (EF 25%, moderate to severe TR and severe pulmonary hypertension with RVSP greater than 60 mmHg, elevated BNP. He is followed at [facility] congestive heart failure clinic.       His wife passed in 2021 and then lost his oldest daughter in 2022.  He resides alone but has another daughter in [place]. He shares with me that she is a DNP, has her own office.  Also, his grandaughter is in college.            DIAGNOSTICS:         Annual chest x-ray and spirometry for a number of years with Dr. Kapadia.   Chest x-ray 6/19/2017.   Spirometry 6/-severe obstructive defect with decreased FVC, no significant interval change.   Spirometry 6/1/2018-severe obstructive defect with decreased FVC.  Interval improvement in FEV1.  No significant change in FVC.   CXR 6/1/2018-no acute cardiopulmonary abnormality.   CXR 5/31/2019- changes

## 2025-07-02 NOTE — PATIENT INSTRUCTIONS
Continue anoro 1 inhalation once daily.     Albuterol 2 puffs 4 times daily if needed for shortness of breath or wheezing.     Continue theophylline as prescribed.

## 2025-07-02 NOTE — PROGRESS NOTES
[facility]  [address] , [address]  [place] [address]  [phone]    Patient Name:  Jerrell Mendez Jr.    YOB: 1940    Office Visit 2025      CHIEF COMPLAINT:      Chief Complaint   Patient presents with    Follow-up     COPD, PFT's,         ASSESSMENT:   (Medical Decision Making)                                                                                                                                          Encounter Diagnoses   Name Primary?    COPD, severe (HCC) Yes    Chronic combined systolic and diastolic congestive heart failure (HCC)     Asbestos exposure      He remains stable symptomatically and is compliant with maintenance inhaler.  He has required albuterol on very limited basis, but with good response.  He has been on theophylline for some time and wishes to continue it as he believes it has been beneficial for him.    CHF appears compensated at that time, followed at [facility] CHF clinic.    No evidence of asbestosis on prior imaging.                      PLAN:     Continue anoro 1 inhalation once daily.     Albuterol 2 puffs 4 times daily if needed for shortness of breath or wheezing.     Continue theophylline as prescribed.    Orders Placed This Encounter   Procedures    Spirometry Without Bronchodilator       Orders Placed This Encounter   Medications    umeclidinium-vilanterol (ANORO ELLIPTA) 62.5-25 MCG/ACT inhaler     Si inhalation every morning     Dispense:  1 each     Refill:  11    theophylline (UNIPHYL) 400 MG extended release tablet     Sig: Take 1 tablet by mouth daily     Dispense:  90 tablet     Refill:  3    albuterol sulfate HFA (PROVENTIL;VENTOLIN;PROAIR) 108 (90 Base) MCG/ACT inhaler     Sig: INHALE 2 PUFFS BY MOUTH 4 TIMES A DAY AS NEEDED FOR WHEEZING or shortness of breath     Dispense:  8 g     Refill:  11           TEO BUCIO - LUCIA    Total  time spent with patient -34 min.